# Patient Record
Sex: MALE | Race: WHITE | NOT HISPANIC OR LATINO | Employment: OTHER | URBAN - NONMETROPOLITAN AREA
[De-identification: names, ages, dates, MRNs, and addresses within clinical notes are randomized per-mention and may not be internally consistent; named-entity substitution may affect disease eponyms.]

---

## 2024-02-04 ENCOUNTER — APPOINTMENT (OUTPATIENT)
Dept: RADIOLOGY | Facility: HOSPITAL | Age: 34
End: 2024-02-04
Payer: OTHER GOVERNMENT

## 2024-02-04 ENCOUNTER — HOSPITAL ENCOUNTER (EMERGENCY)
Facility: HOSPITAL | Age: 34
Discharge: HOME | End: 2024-02-04
Attending: EMERGENCY MEDICINE
Payer: OTHER GOVERNMENT

## 2024-02-04 VITALS
HEART RATE: 82 BPM | RESPIRATION RATE: 19 BRPM | DIASTOLIC BLOOD PRESSURE: 88 MMHG | BODY MASS INDEX: 32.44 KG/M2 | OXYGEN SATURATION: 98 % | SYSTOLIC BLOOD PRESSURE: 132 MMHG | WEIGHT: 219 LBS | HEIGHT: 69 IN | TEMPERATURE: 98.2 F

## 2024-02-04 DIAGNOSIS — R51.9 NONINTRACTABLE EPISODIC HEADACHE, UNSPECIFIED HEADACHE TYPE: Primary | ICD-10-CM

## 2024-02-04 LAB
ANION GAP SERPL CALC-SCNC: 11 MMOL/L (ref 10–20)
BASOPHILS # BLD AUTO: 0.15 X10*3/UL (ref 0–0.1)
BASOPHILS NFR BLD AUTO: 2.1 %
BUN SERPL-MCNC: 12 MG/DL (ref 6–23)
CALCIUM SERPL-MCNC: 8.9 MG/DL (ref 8.6–10.3)
CHLORIDE SERPL-SCNC: 105 MMOL/L (ref 98–107)
CO2 SERPL-SCNC: 25 MMOL/L (ref 21–32)
CREAT SERPL-MCNC: 1.06 MG/DL (ref 0.5–1.3)
EGFRCR SERPLBLD CKD-EPI 2021: >90 ML/MIN/1.73M*2
EOSINOPHIL # BLD AUTO: 0.38 X10*3/UL (ref 0–0.7)
EOSINOPHIL NFR BLD AUTO: 5.3 %
ERYTHROCYTE [DISTWIDTH] IN BLOOD BY AUTOMATED COUNT: 12.1 % (ref 11.5–14.5)
FLUAV RNA RESP QL NAA+PROBE: NOT DETECTED
FLUBV RNA RESP QL NAA+PROBE: NOT DETECTED
GLUCOSE SERPL-MCNC: 135 MG/DL (ref 74–99)
HCT VFR BLD AUTO: 44.6 % (ref 41–52)
HGB BLD-MCNC: 15.5 G/DL (ref 13.5–17.5)
HOLD SPECIMEN: NORMAL
IMM GRANULOCYTES # BLD AUTO: 0.01 X10*3/UL (ref 0–0.7)
IMM GRANULOCYTES NFR BLD AUTO: 0.1 % (ref 0–0.9)
LYMPHOCYTES # BLD AUTO: 2.79 X10*3/UL (ref 1.2–4.8)
LYMPHOCYTES NFR BLD AUTO: 38.6 %
MAGNESIUM SERPL-MCNC: 1.97 MG/DL (ref 1.6–2.4)
MCH RBC QN AUTO: 30.8 PG (ref 26–34)
MCHC RBC AUTO-ENTMCNC: 34.8 G/DL (ref 32–36)
MCV RBC AUTO: 89 FL (ref 80–100)
MONOCYTES # BLD AUTO: 0.51 X10*3/UL (ref 0.1–1)
MONOCYTES NFR BLD AUTO: 7.1 %
NEUTROPHILS # BLD AUTO: 3.39 X10*3/UL (ref 1.2–7.7)
NEUTROPHILS NFR BLD AUTO: 46.8 %
NRBC BLD-RTO: 0 /100 WBCS (ref 0–0)
PLATELET # BLD AUTO: 265 X10*3/UL (ref 150–450)
POTASSIUM SERPL-SCNC: 3.5 MMOL/L (ref 3.5–5.3)
RBC # BLD AUTO: 5.04 X10*6/UL (ref 4.5–5.9)
SARS-COV-2 RNA RESP QL NAA+PROBE: NOT DETECTED
SODIUM SERPL-SCNC: 137 MMOL/L (ref 136–145)
WBC # BLD AUTO: 7.2 X10*3/UL (ref 4.4–11.3)

## 2024-02-04 PROCEDURE — 99284 EMERGENCY DEPT VISIT MOD MDM: CPT | Mod: 25 | Performed by: EMERGENCY MEDICINE

## 2024-02-04 PROCEDURE — 80048 BASIC METABOLIC PNL TOTAL CA: CPT | Performed by: EMERGENCY MEDICINE

## 2024-02-04 PROCEDURE — 96374 THER/PROPH/DIAG INJ IV PUSH: CPT | Mod: 59

## 2024-02-04 PROCEDURE — 96375 TX/PRO/DX INJ NEW DRUG ADDON: CPT | Mod: 59

## 2024-02-04 PROCEDURE — 87636 SARSCOV2 & INF A&B AMP PRB: CPT | Performed by: EMERGENCY MEDICINE

## 2024-02-04 PROCEDURE — 36415 COLL VENOUS BLD VENIPUNCTURE: CPT | Performed by: EMERGENCY MEDICINE

## 2024-02-04 PROCEDURE — 70496 CT ANGIOGRAPHY HEAD: CPT | Performed by: RADIOLOGY

## 2024-02-04 PROCEDURE — 70496 CT ANGIOGRAPHY HEAD: CPT

## 2024-02-04 PROCEDURE — 83735 ASSAY OF MAGNESIUM: CPT | Performed by: EMERGENCY MEDICINE

## 2024-02-04 PROCEDURE — 85025 COMPLETE CBC W/AUTO DIFF WBC: CPT | Performed by: EMERGENCY MEDICINE

## 2024-02-04 PROCEDURE — 2500000004 HC RX 250 GENERAL PHARMACY W/ HCPCS (ALT 636 FOR OP/ED): Performed by: EMERGENCY MEDICINE

## 2024-02-04 PROCEDURE — 2550000001 HC RX 255 CONTRASTS: Performed by: EMERGENCY MEDICINE

## 2024-02-04 RX ORDER — METOCLOPRAMIDE HYDROCHLORIDE 5 MG/ML
10 INJECTION INTRAMUSCULAR; INTRAVENOUS ONCE
Status: COMPLETED | OUTPATIENT
Start: 2024-02-04 | End: 2024-02-04

## 2024-02-04 RX ORDER — OMEPRAZOLE 40 MG/1
40 CAPSULE, DELAYED RELEASE ORAL
COMMUNITY
Start: 2024-01-08

## 2024-02-04 RX ORDER — MORPHINE SULFATE 2 MG/ML
2 INJECTION, SOLUTION INTRAMUSCULAR; INTRAVENOUS ONCE
Status: COMPLETED | OUTPATIENT
Start: 2024-02-04 | End: 2024-02-04

## 2024-02-04 RX ORDER — DIPHENHYDRAMINE HYDROCHLORIDE 50 MG/ML
50 INJECTION INTRAMUSCULAR; INTRAVENOUS ONCE
Status: COMPLETED | OUTPATIENT
Start: 2024-02-04 | End: 2024-02-04

## 2024-02-04 RX ORDER — AMLODIPINE BESYLATE 10 MG/1
5 TABLET ORAL DAILY
COMMUNITY
Start: 2024-01-11

## 2024-02-04 RX ORDER — PROPRANOLOL HYDROCHLORIDE 80 MG/1
80 TABLET ORAL DAILY
COMMUNITY

## 2024-02-04 RX ORDER — CEPHALEXIN 500 MG/1
500 CAPSULE ORAL 3 TIMES DAILY
COMMUNITY
Start: 2024-02-01

## 2024-02-04 RX ORDER — MIRTAZAPINE 30 MG/1
15 TABLET, FILM COATED ORAL NIGHTLY
COMMUNITY
Start: 2024-01-31

## 2024-02-04 RX ORDER — DICLOFENAC SODIUM 75 MG/1
75 TABLET, DELAYED RELEASE ORAL 2 TIMES DAILY
COMMUNITY
Start: 2024-01-11

## 2024-02-04 RX ADMIN — IOHEXOL 50 ML: 350 INJECTION, SOLUTION INTRAVENOUS at 05:25

## 2024-02-04 RX ADMIN — DIPHENHYDRAMINE HYDROCHLORIDE 50 MG: 50 INJECTION, SOLUTION INTRAMUSCULAR; INTRAVENOUS at 03:26

## 2024-02-04 RX ADMIN — MORPHINE SULFATE 2 MG: 2 INJECTION, SOLUTION INTRAMUSCULAR; INTRAVENOUS at 03:28

## 2024-02-04 RX ADMIN — METOCLOPRAMIDE 10 MG: 5 INJECTION, SOLUTION INTRAMUSCULAR; INTRAVENOUS at 03:28

## 2024-02-04 ASSESSMENT — PAIN SCALES - GENERAL
PAINLEVEL_OUTOF10: 5 - MODERATE PAIN
PAINLEVEL_OUTOF10: 7
PAINLEVEL_OUTOF10: 7
PAINLEVEL_OUTOF10: 5 - MODERATE PAIN

## 2024-02-04 ASSESSMENT — PAIN DESCRIPTION - ORIENTATION
ORIENTATION: UPPER;MID
ORIENTATION: UPPER

## 2024-02-04 ASSESSMENT — PAIN DESCRIPTION - DESCRIPTORS: DESCRIPTORS: ACHING

## 2024-02-04 ASSESSMENT — PAIN - FUNCTIONAL ASSESSMENT
PAIN_FUNCTIONAL_ASSESSMENT: 0-10
PAIN_FUNCTIONAL_ASSESSMENT: 0-10

## 2024-02-04 ASSESSMENT — PAIN DESCRIPTION - LOCATION
LOCATION: HEAD
LOCATION: HEAD

## 2024-02-04 ASSESSMENT — COLUMBIA-SUICIDE SEVERITY RATING SCALE - C-SSRS
6. HAVE YOU EVER DONE ANYTHING, STARTED TO DO ANYTHING, OR PREPARED TO DO ANYTHING TO END YOUR LIFE?: NO
2. HAVE YOU ACTUALLY HAD ANY THOUGHTS OF KILLING YOURSELF?: NO
1. IN THE PAST MONTH, HAVE YOU WISHED YOU WERE DEAD OR WISHED YOU COULD GO TO SLEEP AND NOT WAKE UP?: NO

## 2024-02-04 NOTE — ED PROVIDER NOTES
HPI   Chief Complaint   Patient presents with    Headache       34-year-old male presents emergency department with a headache.  Patient states he has a history of migraine headaches but over the past few days he has been having a worse headache.  It does not seem to be his usual migraine headache is more throughout his whole head but it does not go up and down and is not associated with changes in photophobia with normally his headache pills.  Denies any difficulty with speech, focal weakness of arms or legs, problems with balance.  States that he is concerned because he is waiting to get seen by neurology because he had an MRI done last year that showed an abnormality in the blood vessel in his brain.  He is not sure what.  Also states that his blood pressure has been running high.  States he has been compliant with his medications.                          Whitney Coma Scale Score: 15                  Patient History   Past Medical History:   Diagnosis Date    Migraine      History reviewed. No pertinent surgical history.  No family history on file.  Social History     Tobacco Use    Smoking status: Every Day     Packs/day: .5     Types: Cigarettes    Smokeless tobacco: Never   Substance Use Topics    Alcohol use: Not on file    Drug use: Not Currently       Physical Exam   ED Triage Vitals [02/04/24 0253]   Temperature Heart Rate Respirations BP   36.8 °C (98.2 °F) 98 20 (!) 167/109      SpO2 Temp Source Heart Rate Source Patient Position   97 % Oral Monitor Sitting      BP Location FiO2 (%)     Left arm --       Physical Exam  Constitutional:       Appearance: Normal appearance.   HENT:      Head: Normocephalic and atraumatic.      Mouth/Throat:      Mouth: Mucous membranes are moist.   Eyes:      Extraocular Movements: Extraocular movements intact.      Pupils: Pupils are equal, round, and reactive to light.   Neck:      Comments: No meningismus  Cardiovascular:      Rate and Rhythm: Normal rate.   Pulmonary:       Effort: Pulmonary effort is normal.      Breath sounds: Normal breath sounds.   Abdominal:      Palpations: Abdomen is soft.   Musculoskeletal:         General: Normal range of motion.      Cervical back: No rigidity.   Skin:     General: Skin is warm and dry.   Neurological:      General: No focal deficit present.      Mental Status: He is alert and oriented to person, place, and time.      Comments: Patient is awake, alert and oriented x 3.  He has clear speech and steady gait.  There is no facial droop.  There is no arm drift.  There is symmetrical strength.  Reflexes are symmetrical.   Psychiatric:         Behavior: Behavior normal.         ED Course & MDM   ED Course as of 02/04/24 0807   Sun Feb 04, 2024   0753 MCHC: 34.8 [RM]      ED Course User Index  [RM] Fish Funes MD         Diagnoses as of 02/04/24 0807   Nonintractable episodic headache, unspecified headache type       Medical Decision Making  33-year-old male who presents with a headache and questionable history of brain aneurysm.  I did review the patient's MRI from last year which questioned a brain aneurysm on the right anterior cerebral artery at the junction with the anterior communicating artery.  CTA head without and with contrast was done which reveals no evidence of intracranial hemorrhage, occlusion or aneurysm.  Patient has been treated for his headache.  Headache is resolved.  Initially hypertensive, his blood pressure has normalized after headache control.  Blood work to I reviewed and interpreted independently reveals a normal white count, normal electrolytes, normal renal functions and negative COVID and flu.  He has been compliant with his blood pressure medication.  Patient will be discharged in improved condition.  He is to follow-up with neurology as per appointment.    Amount and/or Complexity of Data Reviewed  Labs:  Decision-making details documented in ED Course.  Radiology:  Decision-making details documented in ED  Course.      CT angio head w and wo IV contrast   Final Result   1. No evidence for significant stenosis or large branch vessel   cutoffs of the intracranial arteries.   2. No acute intracranial hemorrhage or acute territorial infarct.        MACRO:   None        Signed by: Amie Aden 2/4/2024 7:38 AM   Dictation workstation:   [id number]        Labs Reviewed   CBC WITH AUTO DIFFERENTIAL - Abnormal       Result Value    WBC 7.2      nRBC 0.0      RBC 5.04      Hemoglobin 15.5      Hematocrit 44.6      MCV 89      MCH 30.8      MCHC 34.8      RDW 12.1      Platelets 265      Neutrophils % 46.8      Immature Granulocytes %, Automated 0.1      Lymphocytes % 38.6      Monocytes % 7.1      Eosinophils % 5.3      Basophils % 2.1      Neutrophils Absolute 3.39      Immature Granulocytes Absolute, Automated 0.01      Lymphocytes Absolute 2.79      Monocytes Absolute 0.51      Eosinophils Absolute 0.38      Basophils Absolute 0.15 (*)    BASIC METABOLIC PANEL - Abnormal    Glucose 135 (*)     Sodium 137      Potassium 3.5      Chloride 105      Bicarbonate 25      Anion Gap 11      Urea Nitrogen 12      Creatinine 1.06      eGFR >90      Calcium 8.9     MAGNESIUM - Normal    Magnesium 1.97     SARS-COV-2 AND INFLUENZA A/B PCR - Normal    Flu A Result Not Detected      Flu B Result Not Detected      Coronavirus 2019, PCR Not Detected      Narrative:     This assay has received FDA Emergency Use Authorization (EUA) and  is only authorized for the duration of time that circumstances exist to justify the authorization of the emergency use of in vitro diagnostic tests for the detection of SARS-CoV-2 virus and/or diagnosis of COVID-19 infection under section 564(b)(1) of the Act, 21 U.S.C. 360bbb-3(b)(1). Testing for SARS-CoV-2 is only recommended for patients who meet current clinical and/or epidemiological criteria as defined by federal, state, or local public health directives. This assay is an in vitro diagnostic  nucleic acid amplification test for the qualitative detection of SARS-CoV-2, Influenza A, and Influenza B from nasopharyngeal specimens and has been validated for use at [facility]. Negative results do not preclude COVID-19 infections or Influenza A/B infections, and should not be used as the sole basis for diagnosis, treatment, or other management decisions. If Influenza A/B and RSV PCR results are negative, testing for Parainfluenza virus, Adenovirus and Metapneumovirus is routinely performed for [facility] pediatric oncology and intensive care inpatients, and is available on other patients by placing an add-on request.          Procedure  Procedures     Fish Funes MD  02/04/24 0753       Fish Funes MD  02/04/24 0807

## 2025-02-27 ENCOUNTER — OFFICE VISIT (OUTPATIENT)
Dept: URGENT CARE | Facility: URGENT CARE | Age: 35
End: 2025-02-27
Payer: OTHER GOVERNMENT

## 2025-02-27 VITALS
BODY MASS INDEX: 34.22 KG/M2 | RESPIRATION RATE: 18 BRPM | HEART RATE: 90 BPM | TEMPERATURE: 97.8 F | DIASTOLIC BLOOD PRESSURE: 85 MMHG | WEIGHT: 231.7 LBS | OXYGEN SATURATION: 97 % | SYSTOLIC BLOOD PRESSURE: 127 MMHG

## 2025-02-27 DIAGNOSIS — R09.81 SINUS CONGESTION: ICD-10-CM

## 2025-02-27 DIAGNOSIS — B34.9 ACUTE VIRAL SYNDROME: ICD-10-CM

## 2025-02-27 DIAGNOSIS — J06.9 VIRAL URI WITH COUGH: Primary | ICD-10-CM

## 2025-02-27 DIAGNOSIS — R05.1 ACUTE COUGH: ICD-10-CM

## 2025-02-27 DIAGNOSIS — Z91.89 AT INCREASED RISK OF EXPOSURE TO COVID-19 VIRUS: ICD-10-CM

## 2025-02-27 DIAGNOSIS — Z20.828 EXPOSURE TO THE FLU: ICD-10-CM

## 2025-02-27 LAB
POC BINAX EXPIRATION: NEGATIVE
POC BINAX NOW COVID SERIAL NUMBER: NEGATIVE
POC RAPID INFLUENZA A: NEGATIVE
POC RAPID INFLUENZA B: NEGATIVE
POC RAPID STREP: NEGATIVE
POC SARS-COV-2 AG BINAX: NORMAL

## 2025-02-27 PROCEDURE — 87804 INFLUENZA ASSAY W/OPTIC: CPT | Performed by: NURSE PRACTITIONER

## 2025-02-27 PROCEDURE — 99203 OFFICE O/P NEW LOW 30 MIN: CPT | Performed by: NURSE PRACTITIONER

## 2025-02-27 PROCEDURE — 87811 SARS-COV-2 COVID19 W/OPTIC: CPT | Performed by: NURSE PRACTITIONER

## 2025-02-27 PROCEDURE — 87880 STREP A ASSAY W/OPTIC: CPT | Performed by: NURSE PRACTITIONER

## 2025-02-27 RX ORDER — RIBOFLAVIN (VITAMIN B2) 100 MG
400 TABLET ORAL
COMMUNITY
Start: 2024-12-20

## 2025-02-27 RX ORDER — FLUTICASONE PROPIONATE 50 MCG
2 SPRAY, SUSPENSION (ML) NASAL DAILY
Qty: 16 G | Refills: 0 | Status: SHIPPED | OUTPATIENT
Start: 2025-02-27 | End: 2025-03-13

## 2025-02-27 ASSESSMENT — ENCOUNTER SYMPTOMS
FREQUENCY: 0
COUGH: 1
CHEST TIGHTNESS: 0
WHEEZING: 0
LIGHT-HEADEDNESS: 1
SINUS PRESSURE: 0
WEAKNESS: 0
WOUND: 0
MYALGIAS: 1
SINUS PAIN: 0
OCCASIONAL FEELINGS OF UNSTEADINESS: 0
NECK STIFFNESS: 0
CONFUSION: 0
ACTIVITY CHANGE: 0
DIFFICULTY URINATING: 0
CHILLS: 1
FEVER: 1
LOSS OF SENSATION IN FEET: 0
ABDOMINAL PAIN: 0
DEPRESSION: 0
HEADACHES: 0
NAUSEA: 0
DIAPHORESIS: 0
SORE THROAT: 1
NECK PAIN: 0
FATIGUE: 0
DYSURIA: 0
DIARRHEA: 0
VOMITING: 0
DIZZINESS: 0
PALPITATIONS: 0
TROUBLE SWALLOWING: 1
SHORTNESS OF BREATH: 0
BACK PAIN: 0
APPETITE CHANGE: 0
RHINORRHEA: 1
COLOR CHANGE: 0

## 2025-02-27 ASSESSMENT — PAIN SCALES - GENERAL: PAINLEVEL_OUTOF10: 8

## 2025-02-27 NOTE — PROGRESS NOTES
"Subjective   Patient ID: Rene Forrester is a 34 y.o. male. They present today with a chief complaint of Other (Pt. C/O cough-mucus yellow/green, hot/cold flashes, tired, diarrhea, swelling in eyes, body aches, dizziness, and, lungs feel like they are burning./Started 2 days ago. ).    History of Present Illness  Chief complaint: Productive yellow-green cough, nasal congestion, fatigue, diarrhea, body aches, lightheaded.  Sore throat    HPI: Onset above symptoms 2 days ago no sick contacts.  No chest pain no shortness of breath no leg swelling.  Not vaccinated against influenza or COVID and patient smokes.  Able to drink fluids without incident.  No nausea vomiting.  Reports just a \"few episodes of diarrhea\".  This seems to be subsiding.  Not utilizing anything over-the-counter for the symptoms.  States he is just sitting in the sauna for further relief.  No recent illnesses no recent antibiotics.      Nurses notes, vitals and old chart reviewed      History provided by:  Patient   used: No        Past Medical History  Allergies as of 02/27/2025 - Reviewed 02/27/2025   Allergen Reaction Noted    Gabapentin Hives 02/27/2025    Lamotrigine Other 08/23/2024    Meloxicam Nausea And Vomiting and Unknown 07/09/2015    Venlafaxine Other 09/08/2020    Etodolac Anxiety, Nausea And Vomiting, and Unknown 07/07/2015       (Not in a hospital admission)       Past Medical History:   Diagnosis Date    Migraine        No past surgical history on file.     reports that he has been smoking cigarettes. He has never used smokeless tobacco. He reports that he does not currently use drugs.    Review of Systems  Review of Systems   Constitutional:  Positive for chills and fever. Negative for activity change, appetite change, diaphoresis and fatigue.        Subjective fevers and chills   HENT:  Positive for congestion, ear pain, rhinorrhea, sneezing, sore throat and trouble swallowing. Negative for drooling, " nosebleeds, sinus pressure and sinus pain.    Eyes:  Negative for visual disturbance.   Respiratory:  Positive for cough. Negative for chest tightness, shortness of breath and wheezing.         Productive yellow-green   Cardiovascular:  Negative for chest pain, palpitations and leg swelling.   Gastrointestinal:  Negative for abdominal pain, diarrhea, nausea and vomiting.   Genitourinary:  Negative for decreased urine volume, difficulty urinating, dysuria, frequency and urgency.   Musculoskeletal:  Positive for myalgias. Negative for back pain, neck pain and neck stiffness.   Skin:  Negative for color change and wound.   Neurological:  Positive for light-headedness. Negative for dizziness, syncope, weakness and headaches.   Psychiatric/Behavioral:  Negative for confusion.                                   Objective    Vitals:    02/27/25 1832   BP: 127/85   BP Location: Left arm   Patient Position: Sitting   BP Cuff Size: Large adult   Pulse: 90   Resp: 18   Temp: 36.6 °C (97.8 °F)   TempSrc: Oral   SpO2: 97%   Weight: 105 kg (231 lb 11.3 oz)     No LMP for male patient.    Physical Exam  Physical Exam  Constitutional:       General: Pateint is awake. Patient is not in acute distress.     Appearance: Normal appearance. Pateint is well-developed and well-groomed. Patient is not ill-appearing, toxic-appearing or diaphoretic.   HENT:      Head: Normocephalic and atraumatic. No right periorbital erythema or left periorbital erythema.      Jaw: There is normal jaw occlusion.      Right Ear: Hearing, tympanic membrane, ear canal and external ear normal. No tenderness. No mastoid tenderness.      Left Ear: Hearing, tympanic membrane, ear canal and external ear normal. No tenderness. No mastoid tenderness.      Nose: Congestion and rhinorrhea are present.  Large amounts of thick yellow-green drainage.  Negative for frontal, ethmoid, and maxillary sinus tenderness, erythema or edema. Neg periorbital edema or erythema.       Comments:      Mouth/Throat:      Lips: Pink.      Mouth: Mucous membranes are moist. No angioedema. No sublingual or lingual edema.      Pharynx: Posterior pharynx with mild erythema.  Uvula midline without uvulitis.  Negative for loculated or confluent exudate. No angioedema. No PTA formation. No petechial rash  Eyes:   PERRLA, EOMS intact     Conjunctiva/sclera: Conjunctivae normal.   Lymphatics: Neg for submandibular, submental. Neg ant or post cervical.   Cardiovascular:      Rate and Rhythm: Normal rate.  RRR.      Pulses: Normal pulses.      Heart sounds: Normal heart sounds. No MGR.  Pulmonary:      Effort: Pulmonary effort is normal.      Breath sounds: Normal breath sounds and air entry.  No adventitious sounds heard bronchial bronchovesicular vesicular regions and patient speaking in full sentences.  ABD:   ABD RSNT. Neg CVA tenderness. This is a grossly neg surg abd.   Musculoskeletal:   SANTIAGO without deficits.      Right lower leg: No edema.      Left lower leg: No edema.   NECK: NEG JVD. Full ROM with flex/ext and lateral movements.   Skin:     Capillary Refill: Capillary refill takes less than 2 seconds.   Neurological:      Mental Status: Aox4, following all commands and answering all questions briskly and appropriately.      GCS: GCS eye subscore is 4. GCS verbal subscore is 5. GCS motor subscore is 6.   Psychiatric:         Mood and Affect: Mood normal.         Behavior: Behavior normal. Behavior is cooperative.         Thought Content: Thought content normal.         Judgment: Judgment normal.   Procedures    Point of Care Test & Imaging Results from this visit  No results found for this visit on 02/27/25.   No results found.    Diagnostic study results (if any) were reviewed by TC Escobedo.    Assessment/Plan   Allergies, medications, history, and pertinent labs/EKGs/Imaging reviewed by TC Escobedo.     Medical Decision Making  HPI: SEE NARRATIVE  HISTORIAN:  Pateint  INDEPENDENT HISTORIAN:   RECORDS REVIEWED:  DIFFERENTIAL DX: Influenza versus COVID versus viral URI with cough versus viral syndrome.  Strep pharyngitis versus viral pharyngitis  Less likely pneumonia pulse ox within normal limits lung sounds are clear.  LABS: Influenza COVID and strep negative  XRAY: No need for chest x-ray lungs clear pulse ox within normal limits.  EKG:  IN CLINIC MEDICATIONS: NONE  CONSULTED WITH:  DIAGNOSIS: See urgent care course of treatment  SEE URGENT CARE COURSE OF TREATMENT AND DOCUMENTATION FOR RX MEDS GIVEN.   PROCEDURES: SEE PROCEDURE NOTE  Patient and or family were counselled on labs, radiological studies, and all testing applicable for this visit as well as diagnosis. Patient was discharged homewith stable afebrile non-toxic vital signs. They verbalized discharge instructions that were given to them BOTH written and verbally by myself.  They were given ample time to ask questionsand have none at time of disposition.    Orders and Diagnoses  Diagnoses and all orders for this visit:  Exposure to the flu  -     POCT Influenza A/B manually resulted      Medical Admin Record      Patient disposition: Home    Electronically signed by TC Escobedo  6:45 PM

## 2025-07-17 ENCOUNTER — OFFICE VISIT (OUTPATIENT)
Dept: URGENT CARE | Facility: URGENT CARE | Age: 35
End: 2025-07-17
Payer: OTHER GOVERNMENT

## 2025-07-17 VITALS
OXYGEN SATURATION: 99 % | RESPIRATION RATE: 20 BRPM | HEART RATE: 81 BPM | TEMPERATURE: 97.4 F | SYSTOLIC BLOOD PRESSURE: 153 MMHG | DIASTOLIC BLOOD PRESSURE: 90 MMHG | WEIGHT: 231.48 LBS | BODY MASS INDEX: 34.18 KG/M2

## 2025-07-17 DIAGNOSIS — R30.0 DYSURIA: ICD-10-CM

## 2025-07-17 DIAGNOSIS — R31.9 HEMATURIA, UNSPECIFIED TYPE: Primary | ICD-10-CM

## 2025-07-17 LAB
CHLAMYDIA TRACHOMATIS: NOT DETECTED
ELECTRONIC CONTROL: NORMAL
INTERNAL PROCESS CONTROL: NORMAL
NEISSERIA GONORRHOEAE: NOT DETECTED
POC APPEARANCE, URINE: ABNORMAL
POC BILIRUBIN, URINE: NEGATIVE
POC BLOOD, URINE: ABNORMAL
POC COLOR, URINE: ABNORMAL
POC GLUCOSE, URINE: NEGATIVE MG/DL
POC KETONES, URINE: NEGATIVE MG/DL
POC LEUKOCYTES, URINE: NEGATIVE
POC NITRITE,URINE: NEGATIVE
POC PH, URINE: 6 PH
POC PROTEIN, URINE: NEGATIVE MG/DL
POC SPECIFIC GRAVITY, URINE: 1.02
POC UROBILINOGEN, URINE: 0.2 EU/DL

## 2025-07-17 RX ORDER — ACETAMINOPHEN 325 MG/1
650 TABLET ORAL EVERY 6 HOURS PRN
COMMUNITY
Start: 2025-06-02

## 2025-07-17 RX ORDER — SULFAMETHOXAZOLE AND TRIMETHOPRIM 800; 160 MG/1; MG/1
1 TABLET ORAL 2 TIMES DAILY
Qty: 14 TABLET | Refills: 0 | Status: SHIPPED | OUTPATIENT
Start: 2025-07-17 | End: 2025-07-24

## 2025-07-17 RX ORDER — CELECOXIB 100 MG/1
100 CAPSULE ORAL
COMMUNITY
Start: 2025-06-02

## 2025-07-17 NOTE — PROGRESS NOTES
Subjective   Patient ID: Rene Forrester is a 34 y.o. male. They present today with a chief complaint of Difficulty Urinating (Frequency in urination, started 7-10 days ago, pt thought he was passing a kidney stone, but is getting worse. Pelvic pressure. Caffeine appears to make things worse. No known exposure to STI, but could have been possible with ex-partner. ).    History of Present Illness    History provided by:  Patient   used: No        Past Medical History  Allergies as of 07/17/2025 - Reviewed 07/17/2025   Allergen Reaction Noted    Gabapentin Hives 02/27/2025    Lamotrigine Other 08/23/2024    Meloxicam Nausea And Vomiting and Unknown 07/09/2015    Venlafaxine Other 09/08/2020    Etodolac Anxiety, Nausea And Vomiting, and Unknown 07/07/2015       Prescriptions Prior to Admission[1]     Medical History[2]    Surgical History[3]     reports that he has been smoking cigarettes. He has never used smokeless tobacco. He reports that he does not currently use drugs.    Review of Systems  Review of Systems   All other systems reviewed and are negative.                                 Objective    Vitals:    07/17/25 1706   BP: 153/90   Pulse: 81   Resp: 20   Temp: 36.3 °C (97.4 °F)   TempSrc: Oral   SpO2: 99%   Weight: 105 kg (231 lb 7.7 oz)     No LMP for male patient.    Physical Exam  Vitals and nursing note reviewed.   Constitutional:       General: He is not in acute distress.     Appearance: He is normal weight. He is not ill-appearing, toxic-appearing or diaphoretic.   HENT:      Head: Normocephalic and atraumatic.      Nose: Nose normal.      Mouth/Throat:      Mouth: Mucous membranes are moist.     Eyes:      General: No scleral icterus.        Right eye: No discharge.         Left eye: No discharge.      Extraocular Movements: Extraocular movements intact.      Conjunctiva/sclera: Conjunctivae normal.      Pupils: Pupils are equal, round, and reactive to light.        Cardiovascular:      Rate and Rhythm: Normal rate and regular rhythm.      Pulses: Normal pulses.      Heart sounds: No murmur heard.     No friction rub. No gallop.   Pulmonary:      Effort: Pulmonary effort is normal. No respiratory distress.      Breath sounds: No stridor. No wheezing, rhonchi or rales.   Abdominal:      General: Abdomen is flat.      Tenderness: There is no abdominal tenderness. There is no right CVA tenderness, left CVA tenderness, guarding or rebound.     Musculoskeletal:      Cervical back: Normal range of motion and neck supple. No rigidity or tenderness.     Skin:     General: Skin is warm and dry.      Capillary Refill: Capillary refill takes less than 2 seconds.     Neurological:      General: No focal deficit present.      Mental Status: He is alert.     Psychiatric:         Mood and Affect: Mood normal.         Behavior: Behavior normal.         Procedures    Point of Care Test & Imaging Results from this visit  Results for orders placed or performed in visit on 07/17/25   POCT UA Automated manually resulted   Result Value Ref Range    POC Color, Urine Dark Lala (A) Straw, Yellow, Light-Yellow    POC Appearance, Urine Turbid (A) Clear    POC Glucose, Urine NEGATIVE NEGATIVE mg/dl    POC Bilirubin, Urine NEGATIVE NEGATIVE    POC Ketones, Urine NEGATIVE NEGATIVE mg/dl    POC Specific Gravity, Urine 1.025 1.005 - 1.035    POC Blood, Urine LARGE (3+) (A) NEGATIVE    POC PH, Urine 6.0 No Reference Range Established PH    POC Protein, Urine NEGATIVE NEGATIVE mg/dl    POC Urobilinogen, Urine 0.2 0.2, 1.0 EU/DL    Poc Nitrite, Urine NEGATIVE NEGATIVE    POC Leukocytes, Urine NEGATIVE NEGATIVE   POCT WS CHLAMYDIA GONORRHEA PCR BINX manually resulted   Result Value Ref Range    Chlamydia Trachomatis Not Detected Not Detected    Neisseria Gonorrhoeae Not Detected Not Detected    Electronic Control Valid     Internal Process Control Valid       Imaging  No results found.    Cardiology,  Vascular, and Other Imaging  No other imaging results found for the past 2 days      Diagnostic study results (if any) were reviewed by Ne Ortega PA-C.    Assessment/Plan   Allergies, medications, history, and pertinent labs/EKGs/Imaging reviewed by Ne Ortega PA-C.     Medical Decision Making  34 year old with Pmhx of ureterolithiasis presents with suprapubic pressure and urinary frequency for 7 days.  No fever, chills, nausea, vomiting, gross hematuria.  States his urine is normally yellow/orange bc he takes B vitamins.  States he does not have any back or abdominal pain as he had with previous kidney stone but the urinary frequency is similar.  No  dysuria, penile discharge, testicular pain.  No nausea, vomiting or fever.  He states he had relationship end a year ago with unfaithful partner and would like STI testing.  UA with large blood.  Urine color on my review is yellow/orange consistent with vitamin use.  Exam is benign.  No features pyelonephritis, urinary retention, acute abdomen.  Consider ureterolithiasis given history although less likely with no associated pain. Will trial treatment for complicated acute cystitis as patient is male.  Urine culture sent and urology referral provided.  Trichomonas is sent out.  GC/chlamydia testing neg.  For any abdominal or flank pain advised to go to ED.  Recheck exam if no improvement with antibiotics with PCP/urology.  Encouraged follow-up with primary care provider.  Discussed expected course, indications for return or for presentation to emergency department.  Discharged good condition agreeable to plan as discussed.  Case discussed with attending physician.      Orders and Diagnoses  Diagnoses and all orders for this visit:  Hematuria, unspecified type  -     Referral to Urology; Future  -     sulfamethoxazole-trimethoprim (Bactrim DS) 800-160 mg tablet; Take 1 tablet by mouth 2 times a day for 7 days.  -     Urine Culture  -     Trichomonas  vaginalis, Amplified  Dysuria  -     POCT UA Automated manually resulted  -     POCT WS CHLAMYDIA GONORRHEA PCR BINX manually resulted  -     Trichomonas vaginalis, Amplified      Medical Admin Record      Patient disposition: Home    Electronically signed by Ne Ortega PA-C  9:56 AM           [1] (Not in a hospital admission)   [2]   Past Medical History:  Diagnosis Date    Migraine    [3] No past surgical history on file.

## 2025-07-19 LAB — T VAGINALIS RRNA SPEC QL NAA+PROBE: NOT DETECTED

## 2025-07-20 LAB — BACTERIA UR CULT: NORMAL

## 2025-09-12 ENCOUNTER — APPOINTMENT (OUTPATIENT)
Dept: SURGERY | Facility: CLINIC | Age: 35
End: 2025-09-12
Payer: OTHER GOVERNMENT